# Patient Record
(demographics unavailable — no encounter records)

---

## 2025-01-09 NOTE — HISTORY OF PRESENT ILLNESS
[FreeTextEntry1] : Patient presents for weight loss and overweight/obese comorbidity management  many family social stressors in recent months but schedule is calming down a bit holidays hectic food options choices limited to rich foods has had some regain, last A1c was 5.9 some questions about that number would like to restart ozp at same dose, authorization  considering a fast

## 2025-01-09 NOTE — ASSESSMENT
[FreeTextEntry1] : Bariatric surgery history: n Overweight / obesity comorbidities: htn hld t2dm mehul Current anti-obesity medications: Obesity medication side effects:  reviewed motivations, schedule and some first steps to get started again restart ozp at 0.25 mg, consider dose incr at follow up fasting resources provided, discussed protocol new resources shared for cooking 2 mo follow up after med reapproval

## 2025-02-03 NOTE — HISTORY OF PRESENT ILLNESS
[FreeTextEntry1] : Annual Physical, CPE [de-identified] : Mr. WEI is a 63 year old male that presents to the office for a CPE. Pt feeling generally well, no complaints. He is considering retiring. The patient denies chest pain, shortness of breath, fever, chills, abdominal pain.  PMHx: DMII, obesity, HTN Taking b12, allegra managing type II DM and obesity, Pt f/w weight management- restarted on ozempic 0.25   HTN controlled on rx  EL w update of sleep study, on APAP   hx of diverticulitis 2022, no recurrence, controlling diet, fiber intake   colonoscopy updated w Dr Sagastume 2/23, no significant findings - repeat in 5 years advised  Last A1c 6.4% Metformin increased to 2 tabs BID - tolerating well Shingrix #2 done Continue current regimen Will refer to ENT for likely vertigo if no improvement from Meclizine Trial Meclizine Opt referral provided Would advise increase in Ozempic - he is tolerating it well Reviewed labs from last visit, will repeat after visit w/ weight management  122/74 295lb POCT A1c EKG today

## 2025-02-03 NOTE — PHYSICAL EXAM
[No Acute Distress] : no acute distress [Well Nourished] : well nourished [Well Developed] : well developed [Well-Appearing] : well-appearing [Normal Voice/Communication] : normal voice/communication [Normal Sclera/Conjunctiva] : normal sclera/conjunctiva [PERRL] : pupils equal round and reactive to light [Normal Outer Ear/Nose] : the outer ears and nose were normal in appearance [Normal Oropharynx] : the oropharynx was normal [Normal TMs] : both tympanic membranes were normal [No JVD] : no jugular venous distention [Supple] : supple [No Lymphadenopathy] : no lymphadenopathy [Thyroid Normal, No Nodules] : the thyroid was normal and there were no nodules present [No Respiratory Distress] : no respiratory distress  [Clear to Auscultation] : lungs were clear to auscultation bilaterally [No Accessory Muscle Use] : no accessory muscle use [Normal Rate] : normal rate  [Regular Rhythm] : with a regular rhythm [Normal S1, S2] : normal S1 and S2 [No Murmur] : no murmur heard [No Carotid Bruits] : no carotid bruits [No Abdominal Bruit] : a ~M bruit was not heard ~T in the abdomen [No Varicosities] : no varicosities [Pedal Pulses Present] : the pedal pulses are present [No Edema] : there was no peripheral edema [No Extremity Clubbing/Cyanosis] : no extremity clubbing/cyanosis [Soft] : abdomen soft [Non Tender] : non-tender [Non-distended] : non-distended [No Masses] : no abdominal mass palpated [No HSM] : no HSM [Normal Bowel Sounds] : normal bowel sounds [Normal Sphincter Tone] : normal sphincter tone [No Mass] : no mass [Prostate Enlargement] : the prostate was not enlarged [Prostate Tenderness] : the prostate was not tender [No Prostate Nodules] : no prostate nodules [Normal Supraclavicular Nodes] : no supraclavicular lymphadenopathy [Normal Posterior Cervical Nodes] : no posterior cervical lymphadenopathy [Normal Anterior Cervical Nodes] : no anterior cervical lymphadenopathy [No Spinal Tenderness] : no spinal tenderness [No Joint Swelling] : no joint swelling [Grossly Normal Strength/Tone] : grossly normal strength/tone [No Rash] : no rash [Normal Gait] : normal gait [Coordination Grossly Intact] : coordination grossly intact [No Focal Deficits] : no focal deficits [Speech Grossly Normal] : speech grossly normal [Normal Affect] : the affect was normal [Alert and Oriented x3] : oriented to person, place, and time [Normal Mood] : the mood was normal [Normal Insight/Judgement] : insight and judgment were intact [de-identified] : obese

## 2025-02-03 NOTE — HISTORY OF PRESENT ILLNESS
[FreeTextEntry1] : Annual Physical, CPE [de-identified] : Mr. WEI is a 63 year old male that presents to the office for a CPE. Pt feeling generally well, no complaints. He is considering retiring. The patient denies chest pain, shortness of breath, fever, chills, abdominal pain.  PMHx: DMII, obesity, HTN Taking b12, allegra managing type II DM and obesity, Pt f/w weight management- restarted on ozempic 0.25   HTN controlled on rx  EL w update of sleep study, on APAP   hx of diverticulitis 2022, no recurrence, controlling diet, fiber intake   colonoscopy updated w Dr Sagastume 2/23, no significant findings - repeat in 5 years advised  Last A1c 6.4% Metformin increased to 2 tabs BID - tolerating well Shingrix #2 done Continue current regimen Will refer to ENT for likely vertigo if no improvement from Meclizine Trial Meclizine Opt referral provided Would advise increase in Ozempic - he is tolerating it well Reviewed labs from last visit, will repeat after visit w/ weight management  122/74 295lb POCT A1c EKG today

## 2025-02-03 NOTE — HEALTH RISK ASSESSMENT
[No] : In the past 12 months have you used drugs other than those required for medical reasons? No [No falls in past year] : Patient reported no falls in the past year [0] : 2) Feeling down, depressed, or hopeless: Not at all (0) [PHQ-2 Negative - No further assessment needed] : PHQ-2 Negative - No further assessment needed [Never] : Never [Patient reported colonoscopy was abnormal] : Patient reported colonoscopy was abnormal [None] : None [Employed] : employed [] :  [Change in mental status noted] : No change in mental status noted [ColonoscopyDate] : 02/23

## 2025-02-03 NOTE — PHYSICAL EXAM
[No Acute Distress] : no acute distress [Well Nourished] : well nourished [Well Developed] : well developed [Well-Appearing] : well-appearing [Normal Voice/Communication] : normal voice/communication [Normal Sclera/Conjunctiva] : normal sclera/conjunctiva [PERRL] : pupils equal round and reactive to light [Normal Outer Ear/Nose] : the outer ears and nose were normal in appearance [Normal Oropharynx] : the oropharynx was normal [Normal TMs] : both tympanic membranes were normal [No JVD] : no jugular venous distention [Supple] : supple [No Lymphadenopathy] : no lymphadenopathy [Thyroid Normal, No Nodules] : the thyroid was normal and there were no nodules present [No Respiratory Distress] : no respiratory distress  [Clear to Auscultation] : lungs were clear to auscultation bilaterally [No Accessory Muscle Use] : no accessory muscle use [Normal Rate] : normal rate  [Regular Rhythm] : with a regular rhythm [Normal S1, S2] : normal S1 and S2 [No Murmur] : no murmur heard [No Carotid Bruits] : no carotid bruits [No Abdominal Bruit] : a ~M bruit was not heard ~T in the abdomen [No Varicosities] : no varicosities [Pedal Pulses Present] : the pedal pulses are present [No Edema] : there was no peripheral edema [No Extremity Clubbing/Cyanosis] : no extremity clubbing/cyanosis [Soft] : abdomen soft [Non Tender] : non-tender [Non-distended] : non-distended [No Masses] : no abdominal mass palpated [No HSM] : no HSM [Normal Bowel Sounds] : normal bowel sounds [Normal Sphincter Tone] : normal sphincter tone [No Mass] : no mass [Prostate Enlargement] : the prostate was not enlarged [Prostate Tenderness] : the prostate was not tender [No Prostate Nodules] : no prostate nodules [Normal Supraclavicular Nodes] : no supraclavicular lymphadenopathy [Normal Posterior Cervical Nodes] : no posterior cervical lymphadenopathy [Normal Anterior Cervical Nodes] : no anterior cervical lymphadenopathy [No Spinal Tenderness] : no spinal tenderness [No Joint Swelling] : no joint swelling [Grossly Normal Strength/Tone] : grossly normal strength/tone [No Rash] : no rash [Normal Gait] : normal gait [Coordination Grossly Intact] : coordination grossly intact [No Focal Deficits] : no focal deficits [Speech Grossly Normal] : speech grossly normal [Normal Affect] : the affect was normal [Alert and Oriented x3] : oriented to person, place, and time [Normal Mood] : the mood was normal [Normal Insight/Judgement] : insight and judgment were intact [de-identified] : obese

## 2025-03-12 NOTE — ASSESSMENT
[FreeTextEntry1] : Bariatric surgery history: n Overweight / obesity comorbidities: htn mehul t2dm Current anti-obesity medications:  ozp Obesity medication side effects: n  encouraged to hold steady check in after 3 - 4 mo, reassess cont ozp

## 2025-03-12 NOTE — HISTORY OF PRESENT ILLNESS
[Home] : at home, [unfilled] , at the time of the visit. [Other Location: e.g. Home (Enter Location, City,State)___] : at [unfilled] [Telehealth (audio & video)] : This visit was provided via telehealth using real-time 2-way audio visual technology. 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

## 2025-05-13 NOTE — HISTORY OF PRESENT ILLNESS
[FreeTextEntry1] : Follow up, labs [de-identified] : Mr. WEI is a 64 year old male who presents to the office for follow up. Pt feeling generally well, no complaints. He is considering retiring however has had some increased stressors lately. The patient denies chest pain, shortness of breath, fever, chills, abdominal pain.  PMHx: DMII, obesity, HTN Taking b12, allegra managing type II DM and obesity F/w obesity medicine, no changes to regimen, continues on Ozempic Pt dealing with a lot of life stressors, work is challenging  Last A1c Jan 2025, 6.7 Repeat labs today   HTN controlled on rx  EL w update of sleep study, on APAP   Metformin increased to 2 tabs BID - tolerating well Shingrix #2 done Continue current regimen Has ENT referral for vertigo  Fasting labs today Walking for exercise Spoke about meal prep, healthy meal options Continue current medication regimen, tolerating well

## 2025-05-13 NOTE — PHYSICAL EXAM
[No Acute Distress] : no acute distress [Well Nourished] : well nourished [Well Developed] : well developed [Well-Appearing] : well-appearing [Normal Voice/Communication] : normal voice/communication [Normal Sclera/Conjunctiva] : normal sclera/conjunctiva [PERRL] : pupils equal round and reactive to light [Normal Outer Ear/Nose] : the outer ears and nose were normal in appearance [Normal Oropharynx] : the oropharynx was normal [Normal TMs] : both tympanic membranes were normal [No JVD] : no jugular venous distention [Supple] : supple [No Lymphadenopathy] : no lymphadenopathy [Thyroid Normal, No Nodules] : the thyroid was normal and there were no nodules present [No Respiratory Distress] : no respiratory distress  [Clear to Auscultation] : lungs were clear to auscultation bilaterally [No Accessory Muscle Use] : no accessory muscle use [Normal Rate] : normal rate  [Regular Rhythm] : with a regular rhythm [Normal S1, S2] : normal S1 and S2 [No Murmur] : no murmur heard [No Carotid Bruits] : no carotid bruits [No Abdominal Bruit] : a ~M bruit was not heard ~T in the abdomen [No Varicosities] : no varicosities [Pedal Pulses Present] : the pedal pulses are present [No Edema] : there was no peripheral edema [No Extremity Clubbing/Cyanosis] : no extremity clubbing/cyanosis [Soft] : abdomen soft [Non Tender] : non-tender [Non-distended] : non-distended [No Masses] : no abdominal mass palpated [No HSM] : no HSM [Normal Bowel Sounds] : normal bowel sounds [Normal Sphincter Tone] : normal sphincter tone [No Mass] : no mass [Prostate Enlargement] : the prostate was not enlarged [Prostate Tenderness] : the prostate was not tender [No Prostate Nodules] : no prostate nodules [Normal Supraclavicular Nodes] : no supraclavicular lymphadenopathy [Normal Posterior Cervical Nodes] : no posterior cervical lymphadenopathy [Normal Anterior Cervical Nodes] : no anterior cervical lymphadenopathy [No Spinal Tenderness] : no spinal tenderness [No Joint Swelling] : no joint swelling [Grossly Normal Strength/Tone] : grossly normal strength/tone [No Rash] : no rash [Normal Gait] : normal gait [Coordination Grossly Intact] : coordination grossly intact [No Focal Deficits] : no focal deficits [Speech Grossly Normal] : speech grossly normal [Normal Affect] : the affect was normal [Alert and Oriented x3] : oriented to person, place, and time [Normal Mood] : the mood was normal [Normal Insight/Judgement] : insight and judgment were intact [de-identified] : obese

## 2025-05-13 NOTE — PLAN
[FreeTextEntry1] :  40 minutes spent in patient encounter explaining and formulating rationale for treatment plan. >50% of time spent in direct counseling, reviewing relevant prior notes from specialists, all tests, labs, and imaging and conferring with patient, family member, and other physicians regarding patient care